# Patient Record
Sex: FEMALE | Race: WHITE | NOT HISPANIC OR LATINO | URBAN - METROPOLITAN AREA
[De-identification: names, ages, dates, MRNs, and addresses within clinical notes are randomized per-mention and may not be internally consistent; named-entity substitution may affect disease eponyms.]

---

## 2022-03-01 ENCOUNTER — APPOINTMENT (RX ONLY)
Dept: URBAN - METROPOLITAN AREA CLINIC 81 | Facility: CLINIC | Age: 67
Setting detail: DERMATOLOGY
End: 2022-03-01

## 2022-03-01 DIAGNOSIS — D69.2 OTHER NONTHROMBOCYTOPENIC PURPURA: ICD-10-CM

## 2022-03-01 DIAGNOSIS — L95.8 OTHER VASCULITIS LIMITED TO THE SKIN: ICD-10-CM

## 2022-03-01 DIAGNOSIS — L85.3 XEROSIS CUTIS: ICD-10-CM

## 2022-03-01 PROCEDURE — 99214 OFFICE O/P EST MOD 30 MIN: CPT

## 2022-03-01 PROCEDURE — ? ORDER TESTS

## 2022-03-01 PROCEDURE — ? COUNSELING

## 2022-03-01 PROCEDURE — ? DIAGNOSIS COMMENT

## 2022-03-01 ASSESSMENT — LOCATION DETAILED DESCRIPTION DERM
LOCATION DETAILED: RIGHT DISTAL PALMAR INDEX FINGER
LOCATION DETAILED: RIGHT DISTAL DORSAL INDEX FINGER
LOCATION DETAILED: LEFT RING FINGERTIP
LOCATION DETAILED: LEFT ULNAR DORSAL HAND
LOCATION DETAILED: RIGHT INDEX DISTAL INTERPHALANGEAL JOINT
LOCATION DETAILED: LEFT DORSAL GREAT TOE
LOCATION DETAILED: RIGHT DORSAL GREAT TOE
LOCATION DETAILED: RIGHT DISTAL DORSAL INDEX FINGER
LOCATION DETAILED: RIGHT DORSAL GREAT TOE
LOCATION DETAILED: LEFT DISTAL PALMAR RING FINGER
LOCATION DETAILED: LEFT MID DORSAL INDEX FINGER
LOCATION DETAILED: LEFT DORSAL GREAT TOE
LOCATION DETAILED: LEFT MID DORSAL INDEX FINGER
LOCATION DETAILED: RIGHT RADIAL DORSAL HAND
LOCATION DETAILED: RIGHT RADIAL DORSAL HAND
LOCATION DETAILED: RIGHT INDEX FINGERTIP
LOCATION DETAILED: LEFT ULNAR DORSAL HAND

## 2022-03-01 ASSESSMENT — LOCATION SIMPLE DESCRIPTION DERM
LOCATION SIMPLE: LEFT GREAT TOE
LOCATION SIMPLE: RIGHT INDEX FINGER
LOCATION SIMPLE: LEFT INDEX FINGER
LOCATION SIMPLE: RIGHT HAND
LOCATION SIMPLE: LEFT HAND
LOCATION SIMPLE: LEFT HAND
LOCATION SIMPLE: RIGHT GREAT TOE
LOCATION SIMPLE: RIGHT INDEX FINGER
LOCATION SIMPLE: RIGHT GREAT TOE
LOCATION SIMPLE: LEFT RING FINGER
LOCATION SIMPLE: LEFT GREAT TOE
LOCATION SIMPLE: LEFT INDEX FINGER
LOCATION SIMPLE: LEFT RING FINGER
LOCATION SIMPLE: RIGHT HAND

## 2022-03-01 ASSESSMENT — LOCATION ZONE DERM
LOCATION ZONE: HAND
LOCATION ZONE: FINGER
LOCATION ZONE: HAND
LOCATION ZONE: TOE
LOCATION ZONE: FINGER
LOCATION ZONE: TOE

## 2022-03-01 NOTE — PROCEDURE: ORDER TESTS
Performing Laboratory: [id number]
Bill For Surgical Tray: no
Expected Date Of Service: 03/01/2022
Lab Facility: [id number]
Billing Type: Client Bill

## 2022-03-01 NOTE — HPI: RASH
What Type Of Note Output Would You Prefer (Optional)?: Standard Output
Is The Patient Presenting As Previously Scheduled?: No, they are a work-in
How Severe Is Your Rash?: moderate
Is This A New Presentation, Or A Follow-Up?: Rash
Additional History: Patient states there was also a history of swelling with the rash\\n\\nShe does have a history of Reynaud’s

## 2025-07-05 ENCOUNTER — RX ONLY (RX ONLY)
Age: 70
End: 2025-07-05

## 2025-07-05 RX ORDER — MUPIROCIN 20 MG/G
OINTMENT TOPICAL
Qty: 22 | Refills: 0 | Status: ERX | COMMUNITY
Start: 2025-07-04

## 2025-07-05 RX ORDER — FAMCICLOVIR 500 MG/1
TABLET, FILM COATED ORAL
Qty: 21 | Refills: 0 | Status: ERX | COMMUNITY
Start: 2025-07-04

## 2025-07-08 ENCOUNTER — APPOINTMENT (OUTPATIENT)
Dept: URBAN - METROPOLITAN AREA CLINIC 81 | Facility: CLINIC | Age: 70
Setting detail: DERMATOLOGY
End: 2025-07-08

## 2025-07-08 DIAGNOSIS — B02.9 ZOSTER WITHOUT COMPLICATIONS: ICD-10-CM | Status: RESOLVING

## 2025-07-08 PROCEDURE — ? DIAGNOSIS COMMENT

## 2025-07-08 PROCEDURE — ? PRESCRIPTION MEDICATION MANAGEMENT

## 2025-07-08 PROCEDURE — ? COUNSELING

## 2025-07-08 PROCEDURE — ? PRESCRIPTION

## 2025-07-08 RX ORDER — LIDOCAINE 0.04 G/G
OINTMENT CUTANEOUS
Qty: 30 | Refills: 2 | Status: ERX | COMMUNITY
Start: 2025-07-08

## 2025-07-08 RX ADMIN — LIDOCAINE: 0.04 OINTMENT CUTANEOUS at 00:00

## 2025-07-08 ASSESSMENT — LOCATION SIMPLE DESCRIPTION DERM
LOCATION SIMPLE: RIGHT SUPERIOR EYELID
LOCATION SIMPLE: RIGHT FOREHEAD

## 2025-07-08 ASSESSMENT — LOCATION ZONE DERM
LOCATION ZONE: FACE
LOCATION ZONE: EYELID

## 2025-07-08 ASSESSMENT — LOCATION DETAILED DESCRIPTION DERM
LOCATION DETAILED: RIGHT MEDIAL FOREHEAD
LOCATION DETAILED: RIGHT LATERAL SUPERIOR EYELID

## 2025-07-08 NOTE — HPI: SHINGLES (HERPES ZOSTER)
How Severe Are Your Shingles?: severe
Please Check The Phrase That Best Describes Your Shingles?: segmental
Is This A New Presentation, Or A Follow-Up?: Shingles
Additional History: Pt states she had a bad headache 5 days prior to the shingles outbreak. She states the shingles are in her right eyelid. Pt states she saw the eye doctor this morning, they dilated her eyes and it is only on the eyelid and around the eye. Pt states she has not used the mupirocin ointment yet. Pt states she feels like she has nerve pain. She is on day 4 of famciclovir.

## 2025-07-08 NOTE — PROCEDURE: DIAGNOSIS COMMENT
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
Detail Level: Simple
Render Risk Assessment In Note?: no

## 2025-07-08 NOTE — PROCEDURE: PRESCRIPTION MEDICATION MANAGEMENT
Initiate Treatment: lidocaine 4 % topical ointment - Apply topically to the rash on the face QID as needed.
Continue Regimen: Famciclovir 500mg - Take one tablet PO TID \\n\\nMupirocin ointment - apply topically to the rash on the face BID until healed
Detail Level: Zone
Render In Strict Bullet Format?: No
Plan: - Discussed escalating gabapentin dosage up to 1800mg (max dose) if pt is not having relief. However, pt is having relief with 200mg. She also has contraindications with one of her medications and gabapentin, so it is not a good idea to hit the max dose. \\n- Continue taking gabapentin, pt is currently taking 200mg at night (prescribed by another provider) —> increase to 300mg at night and 100mg in the morning. \\n- Discussed that blister fluid is contagious\\n- Recommended getting the shingles vaccine in 6 months.\\n- Discussed why pt gets more red throughout the day due to increased blood flow\\n- Discussed sending in topical lidocaine if pt wants it